# Patient Record
Sex: MALE | URBAN - METROPOLITAN AREA
[De-identification: names, ages, dates, MRNs, and addresses within clinical notes are randomized per-mention and may not be internally consistent; named-entity substitution may affect disease eponyms.]

---

## 2022-08-16 ENCOUNTER — TRANSCRIBE ORDERS (OUTPATIENT)
Dept: ADMINISTRATIVE | Facility: HOSPITAL | Age: 57
End: 2022-08-16

## 2025-03-16 NOTE — PROGRESS NOTES
"  Elianesaloni Gloverrioseliane  : 1965  MRN: [id number]  CSN: [id number]         Post-operative Day #2  Subjective     CC: hospital follow-up    test     Objective     Min/max vitals past 24 hours:   @FLOWSTAT(6:24::1)@  @FLOWSTAT(5:24::1)@  @FLOWSTAT(8:24::1)@  @FLOWSTAT(9:24::1)@        General: well developed; well nourished  no acute distress   Abdomen: soft, non-tender; no masses  no umbilical or inguinal hernias are present  no hepato-splenomegaly         Pelvic: Not performed   Ext: Calves NT         No results found for: \"ABORH\", \"RH\", \"HEPBSAG\"             Assessment   POD #2 S/P   Doing well     Plan   Continue routine post-operative care    Jimmy Schaefer MD  3/16/2025  12:35 EDT          "

## 2025-08-21 ENCOUNTER — TRANSCRIBE ORDERS (OUTPATIENT)
Dept: ULTRASOUND IMAGING | Facility: HOSPITAL | Age: 60
End: 2025-08-21